# Patient Record
Sex: MALE | Race: BLACK OR AFRICAN AMERICAN | NOT HISPANIC OR LATINO | URBAN - METROPOLITAN AREA
[De-identification: names, ages, dates, MRNs, and addresses within clinical notes are randomized per-mention and may not be internally consistent; named-entity substitution may affect disease eponyms.]

---

## 2018-01-15 NOTE — PROGRESS NOTES
Assessment    1  BMI 30 0-30 9,adult (V85 30) (Z68 30)   2  Encounter for preventive health examination (V70 0) (Z00 00)   3  Obesity (278 00) (E66 9)    Plan  Obesity    · Begin a limited exercise program ; Status:Complete;   Done: 84NKI0232   · Eat a low fat and low cholesterol diet ; Status:Complete;   Done: 85XGK7176   · Shared Decision Making Aid given; Status:Complete;   Done: 86ZPI8671   · Some eating tips that can help you lose weight ; Status:Complete;   Done: 33DJF0309   · We recommend that you bring your body mass index down to 26 ; Status:Complete;    Done: 90UYE1115    Discussion/Summary  Impression: health maintenance visit  Chief Complaint  pt present for CPE  hg      History of Present Illness  HM, Adult Male: The patient is being seen for a health maintenance and labs reviewed evaluation  General Health: The patient's health since the last visit is described as good  Immunizations status: up to date  Lifestyle:  He consumes a diverse and healthy diet  He exercises regularly  He denies alcohol use  Screening:      Review of Systems    Cardiovascular: no chest pain  Respiratory: no shortness of breath  Gastrointestinal: no abdominal pain  Neurological: no dizziness  Active Problems    1  BMI 30 0-30 9,adult (V85 30) (Z68 30)   2  Immunization due (V05 9) (Z23)   3  Lumbago (724 2) (M54 5)   4  Obesity (278 00) (E66 9)   5  Patellofemoral stress syndrome, unspecified laterality (719 46) (M22 2X9)   6  Screening for diabetes mellitus (DM) (V77 1) (Z13 1)   7   Screening for lipid disorders (V77 91) (Z13 220)    Past Medical History    · History of Acute serous otitis media of left ear, recurrence not specified (381 01) (H65 02)   · History of Elevated AST (SGOT) (790 4) (R74 0)   · History of Encounter for procreative management (V26 9) (Z31 9)    Family History    · Family history of No Significant Family History    · Family history of No Significant Family History    Social History    · Never A Smoker    Current Meds   1  No Reported Medications  Requested for: 77CHJ0143 Recorded    Allergies    1  No Known Drug Allergies    Vitals   Recorded: 88OET1132 01:08PM   Temperature 98 2 F   Heart Rate 78   Respiration 16   Systolic 398   Diastolic 70   Height 5 ft 9 in   Weight 209 lb    BMI Calculated 30 86   BSA Calculated 2 1     Physical Exam    Constitutional   General appearance: No acute distress, well appearing and well nourished  Eyes   Conjunctiva and lids: No erythema, swelling or discharge  Pupils and irises: Equal, round, reactive to light  Ears, Nose, Mouth, and Throat   External inspection of ears and nose: Normal     Otoscopic examination: Tympanic membranes translucent with normal light reflex  Canals patent without erythema  Nasal mucosa, septum, and turbinates: Normal without edema or erythema  Lips, teeth, and gums: Normal, good dentition  Oropharynx: Normal with no erythema, edema, exudate or lesions  Neck   Neck: Supple, symmetric, trachea midline, no masses  Thyroid: Normal, no thyromegaly  Pulmonary   Respiratory effort: No increased work of breathing or signs of respiratory distress  Auscultation of lungs: Clear to auscultation  Cardiovascular   Palpation of heart: Normal PMI, no thrills  Auscultation of heart: Normal rate and rhythm, normal S1 and S2, no murmurs  Carotid pulses: 2+ bilaterally  Pedal pulses: 2+ bilaterally  Examination of extremities for edema and/or varicosities: Normal     Abdomen   Abdomen: Non-tender, no masses  Liver and spleen: No hepatomegaly or splenomegaly  Examination for hernias: No hernias appreciated  Genitourinary   Scrotal contents: Normal testes, no masses  Penis: Normal, no lesions  Lymphatic   Palpation of lymph nodes in neck: No lymphadenopathy  Palpation of lymph nodes in axillae: No lymphadenopathy  Palpation of lymph nodes in groin: No lymphadenopathy  Palpation of lymph nodes in other areas: No lymphadenopathy  Musculoskeletal   Gait and station: Normal     Inspection/palpation of digits and nails: Normal without clubbing or cyanosis  Inspection/palpation of joints, bones, and muscles: Normal     Skin   Skin and subcutaneous tissue: Normal without rashes or lesions  Palpation of skin and subcutaneous tissue: Normal turgor  Neurologic   Cranial nerves: Cranial nerves 2-12 intact  Reflexes: 2+ and symmetric  Sensation: No sensory loss      Psychiatric   Judgment and insight: Normal     Mood and affect: Normal        Procedure    Procedure:   Results: 20/25 in both eyes without corrective device, 20/25 in the right eye without corrective device, 20/25 in the left eye without corrective device   Color vision was and the results were normal       Signatures   Electronically signed by : Liyah Jacobs DO; Mar 15 2016 10:43PM EST                       (Author)

## 2022-12-20 ENCOUNTER — TELEPHONE (OUTPATIENT)
Dept: FAMILY MEDICINE CLINIC | Facility: CLINIC | Age: 42
End: 2022-12-20

## 2022-12-20 NOTE — LETTER
Health Maintenance Summary     Topic Due On Due Status Completed On Postpone Until Reason    Colorectal Cancer Screening - Colonoscopy Nov 30, 2000 Postponed  Feb 26, 2018 Patient Refused    Immunization - Pneumococcal Nov 30, 2015 Postponed  Feb 26, 2018 Patient Refused    Abdominal Aortic Aneurysm (AAA) Screening  Nov 30, 2015 Postponed  Feb 26, 2018 Patient Refused    Medicare Wellness Visit Nov 30, 2015 Postponed  Feb 26, 2018 Patient Refused    IMMUNIZATION - DTaP/Tdap/Td Nov 30, 1969 Postponed  Feb 26, 2018 Patient Refused    Immunization-Influenza Sep 1, 2017 Postponed  Feb 26, 2018 Patient Refused    Depression Screening Nov 30, 1962 Overdue       Hepatitis C Screening Nov 30, 2001 Postponed  Feb 26, 2018 Patient Refused          Patient is due for topics as listed above, he wishes to decline at this time .             Noel Segura   [address] [place] [id number]       Records from Insurance indicate that you are a patient of Freida Palemr DO , with [facility], [facility]   Please call the office to establish care and schedule your annual physical today at [phone]  If you are seeing a primary care provider other than the one assigned to you by your insurance, you can simply call the number on the back of your insurance card to change your primary care physician with your insurance company  We thank you for choosing [address] [place] [address] for your healthcare needs      Sincerely,     Hong Brock MA

## 2022-12-20 NOTE — TELEPHONE ENCOUNTER
Called patient to verify pcp as he has not bee seen since 2016  He will need to establish care  LMOM for a call back       Maverick Lilly MA

## 2023-03-14 NOTE — TELEPHONE ENCOUNTER
03/14/23 4:54 PM     The office's request has been received, reviewed, and the patient chart updated  The PCP has successfully been removed with a patient attribution note  This message will now be completed      Thank you  Jazlyn Tubbs